# Patient Record
Sex: FEMALE | Race: WHITE | URBAN - METROPOLITAN AREA
[De-identification: names, ages, dates, MRNs, and addresses within clinical notes are randomized per-mention and may not be internally consistent; named-entity substitution may affect disease eponyms.]

---

## 2017-05-01 ENCOUNTER — OUTPATIENT (OUTPATIENT)
Dept: OUTPATIENT SERVICES | Facility: HOSPITAL | Age: 52
LOS: 1 days | End: 2017-05-01
Payer: MEDICAID

## 2017-05-03 DIAGNOSIS — R69 ILLNESS, UNSPECIFIED: ICD-10-CM

## 2017-10-25 ENCOUNTER — EMERGENCY (EMERGENCY)
Facility: HOSPITAL | Age: 52
LOS: 1 days | Discharge: HOME | End: 2017-10-25
Attending: EMERGENCY MEDICINE
Payer: MEDICAID

## 2017-10-25 VITALS
TEMPERATURE: 98 F | HEART RATE: 86 BPM | OXYGEN SATURATION: 96 % | RESPIRATION RATE: 20 BRPM | WEIGHT: 173.06 LBS | SYSTOLIC BLOOD PRESSURE: 138 MMHG | DIASTOLIC BLOOD PRESSURE: 78 MMHG | HEIGHT: 66 IN

## 2017-10-25 PROCEDURE — 73130 X-RAY EXAM OF HAND: CPT

## 2017-10-25 PROCEDURE — 73030 X-RAY EXAM OF SHOULDER: CPT

## 2017-10-25 PROCEDURE — 99284 EMERGENCY DEPT VISIT MOD MDM: CPT

## 2017-10-25 PROCEDURE — 99284 EMERGENCY DEPT VISIT MOD MDM: CPT | Mod: 25

## 2017-10-25 PROCEDURE — 73610 X-RAY EXAM OF ANKLE: CPT | Mod: 26,RT

## 2017-10-25 PROCEDURE — 73610 X-RAY EXAM OF ANKLE: CPT

## 2017-10-25 PROCEDURE — 73130 X-RAY EXAM OF HAND: CPT | Mod: 26,RT

## 2017-10-25 PROCEDURE — 71046 X-RAY EXAM CHEST 2 VIEWS: CPT

## 2017-10-25 PROCEDURE — 71020: CPT | Mod: 26

## 2017-10-25 PROCEDURE — 94640 AIRWAY INHALATION TREATMENT: CPT

## 2017-10-25 PROCEDURE — 71101 X-RAY EXAM UNILAT RIBS/CHEST: CPT

## 2017-10-25 PROCEDURE — 73030 X-RAY EXAM OF SHOULDER: CPT | Mod: 26,RT

## 2017-10-25 PROCEDURE — 71100 X-RAY EXAM RIBS UNI 2 VIEWS: CPT | Mod: 26,RT

## 2017-10-25 RX ORDER — CYCLOBENZAPRINE HYDROCHLORIDE 10 MG/1
10 TABLET, FILM COATED ORAL ONCE
Qty: 0 | Refills: 0 | Status: COMPLETED | OUTPATIENT
Start: 2017-10-25 | End: 2017-10-25

## 2017-10-25 RX ORDER — IPRATROPIUM/ALBUTEROL SULFATE 18-103MCG
3 AEROSOL WITH ADAPTER (GRAM) INHALATION ONCE
Qty: 0 | Refills: 0 | Status: COMPLETED | OUTPATIENT
Start: 2017-10-25 | End: 2017-10-25

## 2017-10-25 RX ORDER — ACETAMINOPHEN 500 MG
2 TABLET ORAL
Qty: 18 | Refills: 0 | OUTPATIENT
Start: 2017-10-25 | End: 2017-10-28

## 2017-10-25 RX ORDER — IBUPROFEN 200 MG
600 TABLET ORAL ONCE
Qty: 0 | Refills: 0 | Status: COMPLETED | OUTPATIENT
Start: 2017-10-25 | End: 2017-10-25

## 2017-10-25 RX ADMIN — Medication 3 MILLILITER(S): at 13:29

## 2017-10-25 RX ADMIN — CYCLOBENZAPRINE HYDROCHLORIDE 10 MILLIGRAM(S): 10 TABLET, FILM COATED ORAL at 13:29

## 2017-10-25 NOTE — ED STATDOCS - PROGRESS NOTE DETAILS
Pt is a 51 y/o female with a pmhx of HIV, HTN, HLD, and asthma presenting s/p fall this morning. Pt is complaining of right shoulder, ankle, neck and rib pain. Agreed with HPI/ROS/PE/Orders from intake.   General: Well appearing, in no distress, sitting comfortably Head: Normocephalic, Atraumatic no scalp deformities on palpation Eyes: PERRLA, conjunctiva pink, sclera white bilaterally Cardio: Regular rate and rhythm, S1/S2, no murmurs Resp: Clear to auscultation b/l, slight wheezing on auscultation Abd: Soft, nontender +BS MSK: Tenderness over right shoulder/clavicle, ribs, ankle, FROM in all extremities, pt is ambulating without any difficulties Neuro: Grossly normal, Alert and orientated CN II-XII intact, muscle strength 5/5, sensation intact   Follow up with plan from intake X-ray of ankle/chest/hand/ribs/shoulder: No evidence of fracture, spoke to Dr. Rosa regarding the x-rays, pt is aware of results and discharge instructions, pt verbalizes she understands results and discharge instructions X-ray of ankle/chest/hand/ribs/shoulder: No evidence of fracture, spoke to Dr. Rosa regarding the x-rays, will give patient pain medication for management pt is aware of results and discharge instructions, pt verbalizes she understands results and discharge instructions

## 2017-10-25 NOTE — ED STATDOCS - OBJECTIVE STATEMENT
52 year old female, with PMHx of HIV, HTN, HLD, and asthma, presenting to the ED complaining of right sided neck pain, right ankle pain, and right rib pain s/p fall down stairs at approximately 0700 today. Pt states that she had fallen down approximately 8-10 steps while pushing a shopping cart at emergency housing. She states that she attempted to grab a window seal to break her fall with both of her hands and had fallen on top of the shopping cart she was pushing. Pt denies hitting her head or suffering LOC. She states that she is currently on Atorvastatin, Amlodipine, and an Albuterol inhaler. Pt states that she is a smoker. No further complaints at this time.

## 2017-10-25 NOTE — ED ADULT TRIAGE NOTE - CHIEF COMPLAINT QUOTE
Patient arrived to ED today with c/o right side pain after falling down steps today.  Patient denies hitting head or LOC.

## 2017-10-25 NOTE — ED STATDOCS - CARE PLAN
Principal Discharge DX:	Fall Principal Discharge DX:	Rib contusion, right, initial encounter  Secondary Diagnosis:	Contusion of right shoulder, initial encounter

## 2018-06-01 PROCEDURE — G9005: CPT

## 2018-07-01 ENCOUNTER — OUTPATIENT (OUTPATIENT)
Dept: OUTPATIENT SERVICES | Facility: HOSPITAL | Age: 53
LOS: 1 days | End: 2018-07-01
Payer: MEDICAID

## 2018-07-19 DIAGNOSIS — Z71.89 OTHER SPECIFIED COUNSELING: ICD-10-CM

## 2019-06-01 PROCEDURE — G9005: CPT

## 2020-07-29 NOTE — ED STATDOCS - MUSCULOSKELETAL FINDINGS, MLM
Pt called again [facility] told her she needs a PA from [facility] complete in order to make appt and it needed to come from us . She spoke to chaim lyons . Tenderness to right clavicle, right shoulder, right ribs, and right ankle.

## 2023-11-24 NOTE — ED STATDOCS - ATTENDING CONTRIBUTION TO CARE
MOHAN/impairments found/functional limitations in following categories/anticipated discharge recommendation
I, Kadeem Rosa, performed the initial face to face bedside interview with this patient regarding history of present illness, review of symptoms and relevant past medical, social and family history.  I completed an independent physical examination.  I was the initial provider who evaluated this patient. I have signed out the follow up of any pending tests (i.e. labs, radiological studies) to the ACP.  I have communicated the patient’s plan of care and disposition with the ACP.